# Patient Record
Sex: FEMALE | Race: WHITE | NOT HISPANIC OR LATINO | Employment: OTHER | URBAN - NONMETROPOLITAN AREA
[De-identification: names, ages, dates, MRNs, and addresses within clinical notes are randomized per-mention and may not be internally consistent; named-entity substitution may affect disease eponyms.]

---

## 2024-08-18 ENCOUNTER — HOSPITAL ENCOUNTER (EMERGENCY)
Facility: HOSPITAL | Age: 79
Discharge: HOME | End: 2024-08-18
Attending: EMERGENCY MEDICINE
Payer: MEDICARE

## 2024-08-18 ENCOUNTER — APPOINTMENT (OUTPATIENT)
Dept: RADIOLOGY | Facility: HOSPITAL | Age: 79
End: 2024-08-18
Payer: MEDICARE

## 2024-08-18 VITALS
HEIGHT: 69 IN | HEART RATE: 78 BPM | OXYGEN SATURATION: 98 % | SYSTOLIC BLOOD PRESSURE: 146 MMHG | TEMPERATURE: 98.8 F | BODY MASS INDEX: 22.22 KG/M2 | WEIGHT: 150 LBS | DIASTOLIC BLOOD PRESSURE: 73 MMHG | RESPIRATION RATE: 20 BRPM

## 2024-08-18 DIAGNOSIS — S60.222A CONTUSION OF LEFT HAND, INITIAL ENCOUNTER: ICD-10-CM

## 2024-08-18 DIAGNOSIS — S20.312A ABRASION OF LEFT CHEST WALL, INITIAL ENCOUNTER: ICD-10-CM

## 2024-08-18 DIAGNOSIS — S93.601A SPRAIN OF RIGHT FOOT, INITIAL ENCOUNTER: ICD-10-CM
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
## 2024-08-18 DIAGNOSIS — S20.212A CONTUSION OF LEFT CHEST WALL, INITIAL ENCOUNTER: ICD-10-CM

## 2024-08-18 PROCEDURE — 73630 X-RAY EXAM OF FOOT: CPT | Mod: RT

## 2024-08-18 PROCEDURE — 73630 X-RAY EXAM OF FOOT: CPT | Mod: RIGHT SIDE | Performed by: RADIOLOGY

## 2024-08-18 PROCEDURE — 73130 X-RAY EXAM OF HAND: CPT | Mod: LT

## 2024-08-18 PROCEDURE — 2500000001 HC RX 250 WO HCPCS SELF ADMINISTERED DRUGS (ALT 637 FOR MEDICARE OP)

## 2024-08-18 PROCEDURE — 71045 X-RAY EXAM CHEST 1 VIEW: CPT

## 2024-08-18 PROCEDURE — 73130 X-RAY EXAM OF HAND: CPT | Mod: LEFT SIDE | Performed by: RADIOLOGY

## 2024-08-18 PROCEDURE — 99284 EMERGENCY DEPT VISIT MOD MDM: CPT | Mod: 25

## 2024-08-18 PROCEDURE — 71045 X-RAY EXAM CHEST 1 VIEW: CPT | Mod: FOREIGN READ | Performed by: RADIOLOGY

## 2024-08-18 RX ORDER — IBUPROFEN 600 MG/1
600 TABLET ORAL ONCE
Status: COMPLETED | OUTPATIENT
Start: 2024-08-18 | End: 2024-08-18

## 2024-08-18 RX ORDER — IBUPROFEN 600 MG/1
600 TABLET ORAL EVERY 8 HOURS PRN
Qty: 30 TABLET | Refills: 0 | Status: SHIPPED | OUTPATIENT
Start: 2024-08-18

## 2024-08-18 RX ORDER — IBUPROFEN 600 MG/1
TABLET ORAL
Status: COMPLETED
Start: 2024-08-18 | End: 2024-08-18

## 2024-08-18 ASSESSMENT — PAIN DESCRIPTION - PAIN TYPE: TYPE: ACUTE PAIN

## 2024-08-18 ASSESSMENT — PAIN SCALES - GENERAL
PAINLEVEL_OUTOF10: 10 - WORST POSSIBLE PAIN
PAINLEVEL_OUTOF10: 10 - WORST POSSIBLE PAIN

## 2024-08-18 ASSESSMENT — COLUMBIA-SUICIDE SEVERITY RATING SCALE - C-SSRS
1. IN THE PAST MONTH, HAVE YOU WISHED YOU WERE DEAD OR WISHED YOU COULD GO TO SLEEP AND NOT WAKE UP?: NO
2. HAVE YOU ACTUALLY HAD ANY THOUGHTS OF KILLING YOURSELF?: NO
6. HAVE YOU EVER DONE ANYTHING, STARTED TO DO ANYTHING, OR PREPARED TO DO ANYTHING TO END YOUR LIFE?: NO

## 2024-08-18 ASSESSMENT — PAIN - FUNCTIONAL ASSESSMENT: PAIN_FUNCTIONAL_ASSESSMENT: 0-10

## 2024-08-18 NOTE — ED PROVIDER NOTES
[facility]   ED  Provider Note  8/18/2024 11:21 AM  AC11/AC11      Chief Complaint   Patient presents with    Fall        History of Present Illness:   Marisol Mccoy is a 78 y.o. female presenting to the ED for evaluation after a fall, beginning last night.  The complaint has been persistent, moderate in severity, and worsened by walking.  Patient tripped over her dog last night and has pain in her right foot left hand and abrasion on her left anterior chest.  She denies any head or neck injury or pain.  There is no loss of conscious.  There is no prodrome such as chest pain or palpitations prior to simply losing her balance when she tripped.  Patient denies frequent falls.  She has no focal weakness or complaints.  She denies any recent change in medication or diet.  She does not drink or use drugs.      Review of Systems:   Pertinent positives and review of systems as noted above.  Remaining 10 review of systems is negative or noncontributory to today's episode of care.  Review of Systems       --------------------------------------------- PAST HISTORY ---------------------------------------------  Past Medical History: History reviewed. No pertinent past medical history.     Past Surgical History: History reviewed. No pertinent surgical history.     Social History:   Social History     Social History Narrative    Not on file        Family History: family history is not on file. Unless otherwise noted, family history is non contributory    Patient's Medications    No medications on file      The patient’s home medications have been reviewed.    Allergies: Patient has no known allergies.    -------------------------------------------------- RESULTS -------------------------------------------------  All laboratory and radiology results have been personally reviewed by myself   LABS:  Labs Reviewed - No data to display      RADIOLOGY:  Interpreted by Radiologist.  XR foot right 3+ views   Final Result   The patient  "appears demineralized. No acute bony abnormality. Mild   bony degenerative changes.   Signed by Clayton Neves MD      XR hand left 3+ views   Final Result   No acute osseous abnormality.   Signed by Oz Hunter MD      XR chest 1 view   Final Result   No acute process.   Signed by Oz Hunter MD          No results found for this or any previous visit (from the past 4464 hour(s)).  ------------------------- NURSING NOTES AND VITALS REVIEWED ---------------------------   The nursing notes within the ED encounter and vital signs as below have been reviewed.   /73   Pulse 78   Temp 37.1 °C (98.8 °F) (Tympanic)   Resp 20   Ht 1.753 m (5' 9\")   Wt 68 kg (150 lb)   SpO2 98%   BMI 22.15 kg/m²   Oxygen Saturation Interpretation: Normal      ---------------------------------------------------PHYSICAL EXAM--------------------------------------  Physical Exam   Constitutional/General: Alert,  well appearing, non toxic in NAD  Head: Normocephalic and atraumatic  Eyes: PERRL, EOMI, conjunctiva normal, sclera non icteric  Mouth: Oropharynx clear, handling secretions, no trismus, no asymmetry of the posterior oropharynx or uvular edema  Neck: Supple, full ROM, non tender to palpation in the midline, no stridor, no crepitus, no meningeal signs  Respiratory: Lungs clear to auscultation bilaterally, no wheezes, rales, or rhonchi. Not in respiratory distress  Cardiovascular:  Regular rate. Regular rhythm. No murmurs, gallops, or rubs. 2+ distal pulses  Chest: Superficial abrasion left anterior superior chest wall, no bony tenderness subcutaneous air noted  GI:  Abdomen Soft, Non tender, Non distended.  +BS. No organomegaly, no palpable masses,  No rebound, guarding, or rigidity.   Musculoskeletal: Moves all extremities x 4. Warm and well perfused, no clubbing, cyanosis, or edema. Capillary refill <3 seconds there is pain and tenderness palpation over the dorsum of the right foot.  No obvious bruising or deformity.  " No toe tenderness.  No ankle tenderness.  There is bruising over the volar surface of the second third and fourth digits of the left hand.  No obvious deformity.  Integument: skin warm and dry. No rashes.   Lymphatic: no lymphadenopathy noted  Neurologic: No focal deficits, symmetric strength 5/5 in the upper and lower extremities bilaterally  Psychiatric: Normal Affect    Procedures    ------------------------------ ED COURSE/MEDICAL DECISION MAKING----------------------  Diagnoses as of 08/18/24 1318   Fall, initial encounter   Sprain of right foot, initial encounter   Contusion of left hand, initial encounter   Contusion of left chest wall, initial encounter   Abrasion of left chest wall, initial encounter      Patient has a contusion and abrasion to her left chest, a contusion to her left hand and a sprain to her right foot.  X-rays are negative.  Patient is stable for outpatient management.  She will discharge home ibuprofen for pain and advised on wound care instructions for the abrasion and a splint for the left hand and wrist as well as a walking boot for the right foot.      Medical Decision Making:   Discharged to home    Diagnoses as of 08/18/24 1318   Fall, initial encounter   Sprain of right foot, initial encounter   Contusion of left hand, initial encounter   Contusion of left chest wall, initial encounter   Abrasion of left chest wall, initial encounter      Counseling:   The emergency provider has spoken with the patient and discussed today’s results, in addition to providing specific details for the plan of care and counseling regarding the diagnosis and prognosis.  Questions are answered at this time and they are agreeable with the plan.      --------------------------------- IMPRESSION AND DISPOSITION ---------------------------------    Note: Repeat neurovascular exam to the hand was intact after the splint was placed.  The patient is more comfortable.    IMPRESSION  1. Fall, initial encounter     2. Sprain of right foot, initial encounter    3. Contusion of left hand, initial encounter    4. Contusion of left chest wall, initial encounter    5. Abrasion of left chest wall, initial encounter        DISPOSITION  Disposition: Discharge to home  Patient condition is fair      Billing Provider Critical Care Time: 0 minutes     Henry Polanco MD  08/18/24 [id number]

## 2024-08-18 NOTE — ED TRIAGE NOTES
Patient fell last night, tripped over stuff on floor. She is pain to right foot, no swelling or bruising noted but limping with ambulation. Pain to left hand, ecchymosis to 2nd and 3rd fingers (palm side), no decreased ROM. Abrasion to left chest from fall as well.

## 2024-08-18 NOTE — DISCHARGE INSTRUCTIONS
Walking boot for comfort.    Wrist splint for comfort.    Ibuprofen for pain.    Wash abrasion twice per day with soap and water pat dry and apply bacitracin.    Return for worsening symptoms or concerns